# Patient Record
(demographics unavailable — no encounter records)

---

## 2024-12-09 NOTE — DISCUSSION/SUMMARY
[Anticipatory Guidance Given] : Anticipatory guidance addressed as per the history of present illness section [ Transition] :  transition [ Care] :  care [Nutritional Adequacy] : nutritional adequacy [Parental Well-Being] : parental well-being [Safety] : safety [Hepatitis B In Hospital] : Hepatitis B administered while in the hospital [FreeTextEntry1] : 10 day old female born FT via repeat C/S presenting for HCM.  Maternal prenatal labs negative.  Growth and development normal. Has regained birthweight.  PE unremarkable. Maternal depression screen passed.  Mom of AMA and with GDMA, baby sugars wnl.  CCHD and hearing screens passed.  NBS pending. G6PD wnl.  Immunizations UTD.  Right hip click in hospital - Hip US at 4-6 weeks.  Tcb at ~238hol was 6.1, serum threshold 15, photothreshold 21.4.  - Routine  care & anticipatory guidance given - Continue ad sultana feeds at least every 3 hours - Vit D as prescribed - Follow up NBS - Hip US 4-6 weeks - RTC 7 days for weight check and prn - RTC for 1 month HCM and prn - Discussed STRICT precautions for seeking immediate medical attention including but not limited to fever of 100.4F or more, yellowing or increased yellowing of skin or eyes, redness, discharge or foul odor from umbilical stump, poor feeding, lethargy or decreased responsiveness, fast or labored breathing, less than 5 wet diapers daily, rash or any other concerning sign or symptom.  Caretaker expressed understanding of the plan and agrees. All questions were answered.

## 2024-12-09 NOTE — PHYSICAL EXAM
[Alert] : alert [Normocephalic] : normocephalic [Flat Open Anterior [place]] : flat open anterior fontanelle [PERRL] : PERRL [Red Reflex Bilateral] : red reflex bilateral [Normally Placed Ears] : normally placed ears [Auricles Well Formed] : auricles well formed [Nares Patent] : nares patent [Palate Intact] : palate intact [Uvula Midline] : uvula midline [Supple, full passive range of motion] : supple, full passive range of motion [Symmetric Chest Rise] : symmetric chest rise [Clear to Auscultation Bilaterally] : clear to auscultation bilaterally [Regular Rate and Rhythm] : regular rate and rhythm [S1, S2 present] : S1, S2 present [+2 Femoral Pulses] : +2 femoral pulses [Soft] : soft [Bowel Sounds] : bowel sounds present [Umbilical Stump Dry, Clean, Intact] : umbilical stump dry, clean, intact [Normal external genitalia] : normal external genitalia [Patent Vagina] : patent vagina [Patent] : patent [Normally Placed] : normally placed [No Abnormal Lymph Nodes Palpated] : no abnormal lymph nodes palpated [Symmetric Flexed Extremities] : symmetric flexed extremities [Startle Reflex] : startle reflex present [Suck Reflex] : suck reflex present [Rooting] : rooting reflex present [Palmar Grasp] : palmar grasp present [Plantar Grasp] : plantar reflex present [Symmetric Espinoza] : symmetric [place] [Acute Distress] : no acute distress [Icteric sclera] : nonicteric sclera [Preauricular Sinus Tract] : no preauricular sinus tract [Discharge] : no discharge [Palpable Masses] : no palpable masses [Murmurs] : no murmurs [Tender] : nontender [Distended] : not distended [Hepatomegaly] : no hepatomegaly [Splenomegaly] : no splenomegaly [Clitoromegaly] : no clitoromegaly [Clavicular Crepitus] : no clavicular crepitus [Hutchison-Ortolani] : negative Hutchison-Ortolani [Spinal Dimple] : no spinal dimple [Tuft of Hair] : no tuft of hair [Jaundice] : not jaundice

## 2024-12-09 NOTE — HISTORY OF PRESENT ILLNESS
[Breast milk] : breast milk [Formula ___ oz/feed] : [unfilled] oz of formula per feed [Normal] : Normal [___ voids per day] : [unfilled] voids per day [Frequency of stools: ___] : Frequency of stools: [unfilled]  stools [per day] : per day. [In Bassinet/Crib] : sleeps in bassinet/crib [On back] : sleeps on back [Pacifier] : Uses pacifier [No] : No cigarette smoke exposure [Rear facing car seat in back seat] : Rear facing car seat in back seat [Carbon Monoxide Detectors] : Carbon monoxide detectors at home [Smoke Detectors] : Smoke detectors at home. [Hepatitis B Vaccine Given] : Hepatitis B vaccine given [Nirsevimab Given] : Nirsevimab given  [NO] : No [Co-sleeping] : no co-sleeping [Loose bedding, pillow, toys, and/or bumpers in crib] : no loose bedding, pillow, toys, and/or bumpers in crib [Exposure to electronic nicotine delivery system] : No exposure to electronic nicotine delivery system [FreeTextEntry7] : discharged 12/1/24 [de-identified] : Was using Similac and changed [place] formula Sarah [FreeTextEntry8] : no black, blood, or pale stools [FreeTextEntry1] : Grisel Grant Date/Time of Birth 2024 12:01  BW:  3535g, 83% Length:  51 cm, 84% HC:  35cm, 84%ile DW:  3315g Infant Measurement Appropriate for gestational age (AGA)  Place of Birth: [facility] Course Term Female infant born via repeat  at 38w to a  mother, aged 51.  Maternal history of GDMA2 and very advanced maternal age with IVF pregnancy.  D-sticks 52, 47, 47, 52, 72. Apgars were 9 and 9 at 1 and 5 minutes respectively. Infant was AGA. Maternal blood type A+. Hepatitis B vaccine was given. Beyfortus was given. Passed hearing B/L. TCB at 24hrs was 4.3, PT: 12.3. Prenatal labs were as follows: HIV negative 10/18/24, HBsAg negative (24), intrapartum RPR negative, Rubella immune, GBS status unknown. Maternal UDS was negative. Congenital heart disease screening was passed. [facility]  Screening #[phone]. Right hip click on PE, outpatient hip ultrasound at 4-6 weeks of life. G6PD 15.6 wnl  Older brother is 2y/o  FHx:  mom - gdma2, dad - lymphoblastic lymphoma in remission; no asthma

## 2024-12-18 NOTE — DISCUSSION/SUMMARY
[FreeTextEntry1] : 19d/o F presenting for weight check.  Regained birth weight.  Feeding, voiding, stooling appropriately.  Parents report gas.  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  Right hip click today.  - Right hip click - Hip US at 4-6 wks - Lacrimal duct stenosis - massages recommended, return if worsens or persists or redness occurs - Gassy - bicycle legs, abdominal massage, simethicone - Routine  care & anticipatory guidance given - Continue ad sultana feeds at least every 3 hours - Polyvisol/Vit D as directed - Follow up NBS & G6PD - RTC for 1 month HCM and prn - Discussed STRICT precautions for seeking immediate medical attention including but not limited to fever of 100.4F or more, yellowing or increased yellowing of skin or eyes, redness, discharge or foul odor from umbilical stump, poor feeding, lethargy or decreased responsiveness, fast or labored breathing, less than 5 wet diapers daily, rash or any other concerning sign or symptom.  Caretaker expressed understanding of the plan and agrees. All questions were answered.

## 2024-12-18 NOTE — HISTORY OF PRESENT ILLNESS
[de-identified] : weight check [FreeTextEntry6] : 19d/o presenting for weight check.  Breast milk and Holle formula fed q2hr.  Voiding and stooling well.  Has a lot of gas.  They also report eye discharge without eye redness or fever.

## 2024-12-18 NOTE — PHYSICAL EXAM
[NL] : warm, clear [Discharge] : discharge [Bilateral] : (bilateral) [Normal External Genitalia] : normal external genitalia [Patent] : patent [Conjuctival Injection] : no conjunctival injection [de-identified] : right hip click

## 2024-12-24 NOTE — PHYSICAL EXAM
[NL] : warm, clear [FreeTextEntry2] : AFOF [de-identified] : moist mucus membranes [de-identified] : no diaper rash, well perfused, well hydrated; baby acne

## 2024-12-24 NOTE — DISCUSSION/SUMMARY
[FreeTextEntry1] : 25d/o p/w viral illness in the setting of sick contacts.  Still with diarrhea but improving in PO intake and energy level.  Wel hydrated.  No respiratory distress.  Also with rash to cheek c/w  acne. - Vaseline to cheeks, return if worsens or persists. - Continue to focus on feeds and monitor diapers.  Return if any signs of worsening or persists. - Return precautions reviewed. Patient to seek medical attention in ED if has decreased oral intake, decrease in wet diapers/voids, fever >100.4F, difficulty breathing, becomes lethargic, or has a change in mental status or alertness. To note if fever > 5 days must be seen immediately either in clinic or in ED. - Return/ED precautions given.  RTC routine and prn.  Caretaker expressed understanding and all questions answered.

## 2024-12-24 NOTE — HISTORY OF PRESENT ILLNESS
[de-identified] : sick [FreeTextEntry6] : 25d/o F p/w  diarrhea since Friday, with approximately six watery, yellow stools per day. No blood has been observed in the stool. The patient's feeding was affected and baby was sleep - the infant falling asleep at the breast and the mother pumping. Total daily intake is reported as 21-22 ounces usually, was 12-14 oz for 2 day but is now returned to normal.  Activity level returned to baseline.  WD are unchanged from baseline.  A skin rash has been noted on the face, does not bother patient.  No fever. The patient's older brother recently had a viral illness (RSV and entervirus on Thursday), including a double ear infection, which may have been transmitted to the patient.

## 2025-01-10 NOTE — HISTORY OF PRESENT ILLNESS
[Parents] : parents [Normal] : Normal [In Bassinet/Crib] : sleeps in bassinet/crib [On back] : sleeps on back [Pacifier use] : Pacifier use [No] : No cigarette smoke exposure [Rear facing car seat in back seat] : Rear facing car seat in back seat [Carbon Monoxide Detectors] : Carbon monoxide detectors at home [Smoke Detectors] : Smoke detectors at home. [NO] : No [Breast milk] : breast milk [Co-sleeping] : no co-sleeping [Loose bedding, pillow, toys, and/or bumpers in crib] : no loose bedding, pillow, toys, and/or bumpers in crib [Exposure to electronic nicotine delivery system] : No exposure to electronic nicotine delivery system [FreeTextEntry7] : using eczema cream for baby acne [de-identified] : + EBM & Sarah; total 21-22oz per day [FreeTextEntry8] : using simethicone but still gassy; no blood [de-identified] : no tummy time

## 2025-01-10 NOTE — DISCUSSION/SUMMARY
[Anticipatory Guidance Given] : Anticipatory guidance addressed as per the history of present illness section [Parental Well-Being] : parental well-being [Family Adjustment] : family adjustment [Feeding Routines] : feeding routines [Infant Adjustment] : infant adjustment [Safety] : safety [FreeTextEntry1] : 1 month old F presenting for HCM. Growth and development normal. Maternal depression screen w/possible depression, post partum depression resources given.  Immunizations UTD.  - Routine care & anticipatory guidance given - Continue ad sultana feeds - Hip US 4-6 weeks of life - RTC for 2 month old HCM and prn  Caretaker expressed understanding of the plan and agrees. All questions were answered.

## 2025-01-10 NOTE — HISTORY OF PRESENT ILLNESS
[Parents] : parents [Normal] : Normal [In Bassinet/Crib] : sleeps in bassinet/crib [On back] : sleeps on back [Pacifier use] : Pacifier use [No] : No cigarette smoke exposure [Rear facing car seat in back seat] : Rear facing car seat in back seat [Carbon Monoxide Detectors] : Carbon monoxide detectors at home [Smoke Detectors] : Smoke detectors at home. [NO] : No [Breast milk] : breast milk [Co-sleeping] : no co-sleeping [Loose bedding, pillow, toys, and/or bumpers in crib] : no loose bedding, pillow, toys, and/or bumpers in crib [Exposure to electronic nicotine delivery system] : No exposure to electronic nicotine delivery system [FreeTextEntry7] : using eczema cream for baby acne [de-identified] : + EBM & Sarah; total 21-22oz per day [FreeTextEntry8] : using simethicone but still gassy; no blood [de-identified] : no tummy time

## 2025-01-10 NOTE — PHYSICAL EXAM
[Alert] : alert [Normocephalic] : normocephalic [Flat Open Anterior [place]] : flat open anterior fontanelle [PERRL] : PERRL [Red Reflex Bilateral] : red reflex bilateral [Normally Placed Ears] : normally placed ears [Auricles Well Formed] : auricles well formed [Clear Tympanic membranes] : clear tympanic membranes [Light reflex present] : light reflex present [Bony landmarks visible] : bony landmarks visible [Nares Patent] : nares patent [Palate Intact] : palate intact [Uvula Midline] : uvula midline [Supple, full passive range of motion] : supple, full passive range of motion [Symmetric Chest Rise] : symmetric chest rise [Clear to Auscultation Bilaterally] : clear to auscultation bilaterally [Regular Rate and Rhythm] : regular rate and rhythm [S1, S2 present] : S1, S2 present [+2 Femoral Pulses] : +2 femoral pulses [Soft] : soft [Bowel Sounds] : bowel sounds present [Normal external genitailia] : normal external genitalia [Patent Vagina] : vagina patent [Normally Placed] : normally placed [No Abnormal Lymph Nodes Palpated] : no abnormal lymph nodes palpated [Symmetric Flexed Extremities] : symmetric flexed extremities [Startle Reflex] : startle reflex present [Suck Reflex] : suck reflex present [Rooting] : rooting reflex present [Palmar Grasp] : palmar grasp reflex present [Plantar Grasp] : plantar grasp reflex present [Symmetric Espinoza] : symmetric [place] [Acute Distress] : no acute distress [Discharge] : no discharge [Palpable Masses] : no palpable masses [Murmurs] : no murmurs [Tender] : nontender [Distended] : not distended [Hepatomegaly] : no hepatomegaly [Splenomegaly] : no splenomegaly [Clitoromegaly] : no clitoromegaly [Hutchison-Ortolani] : negative Hutchison-Ortolani [Spinal Dimple] : no spinal dimple [Tuft of Hair] : no tuft of hair [Jaundice] : no jaundice [Rash and/or lesion present] : no rash/lesion [de-identified] : baby acne

## 2025-01-10 NOTE — PHYSICAL EXAM
[Alert] : alert [Normocephalic] : normocephalic [Flat Open Anterior [place]] : flat open anterior fontanelle [PERRL] : PERRL [Red Reflex Bilateral] : red reflex bilateral [Normally Placed Ears] : normally placed ears [Auricles Well Formed] : auricles well formed [Clear Tympanic membranes] : clear tympanic membranes [Light reflex present] : light reflex present [Bony landmarks visible] : bony landmarks visible [Nares Patent] : nares patent [Palate Intact] : palate intact [Uvula Midline] : uvula midline [Supple, full passive range of motion] : supple, full passive range of motion [Symmetric Chest Rise] : symmetric chest rise [Clear to Auscultation Bilaterally] : clear to auscultation bilaterally [Regular Rate and Rhythm] : regular rate and rhythm [S1, S2 present] : S1, S2 present [+2 Femoral Pulses] : +2 femoral pulses [Soft] : soft [Bowel Sounds] : bowel sounds present [Normal external genitailia] : normal external genitalia [Patent Vagina] : vagina patent [Normally Placed] : normally placed [No Abnormal Lymph Nodes Palpated] : no abnormal lymph nodes palpated [Symmetric Flexed Extremities] : symmetric flexed extremities [Startle Reflex] : startle reflex present [Suck Reflex] : suck reflex present [Rooting] : rooting reflex present [Palmar Grasp] : palmar grasp reflex present [Plantar Grasp] : plantar grasp reflex present [Symmetric Espinoza] : symmetric [place] [Acute Distress] : no acute distress [Discharge] : no discharge [Palpable Masses] : no palpable masses [Murmurs] : no murmurs [Tender] : nontender [Distended] : not distended [Hepatomegaly] : no hepatomegaly [Splenomegaly] : no splenomegaly [Clitoromegaly] : no clitoromegaly [Hutchison-Ortolani] : negative Hutchison-Ortolani [Spinal Dimple] : no spinal dimple [Tuft of Hair] : no tuft of hair [Jaundice] : no jaundice [Rash and/or lesion present] : no rash/lesion [de-identified] : baby acne

## 2025-01-10 NOTE — DEVELOPMENTAL MILESTONES
[Not Passed] : not passed [Normal Development] : Normal Development [None] : none [FreeTextEntry1] : possible depression, post partum depression resources given [FreeTextEntry2] : 10

## 2025-02-07 NOTE — HISTORY OF PRESENT ILLNESS
[Normal] : Normal [In Bassinet/Crib] : sleeps in bassinet/crib [On back] : sleeps on back [Pacifier use] : Pacifier use [No] : No cigarette smoke exposure [Rear facing car seat in back seat] : Rear facing car seat in back seat [Carbon Monoxide Detectors] : Carbon monoxide detectors at home [Smoke Detectors] : Smoke detectors at home. [NO] : No [Parents] : parents [Vitamins ___] : Patient takes [unfilled] vitamins daily [Co-sleeping] : no co-sleeping [Loose bedding, pillow, toys, and/or bumpers in crib] : no loose bedding, pillow, toys, and/or bumpers in crib [Exposure to electronic nicotine delivery system] : No exposure to electronic nicotine delivery system [de-identified] : EBM and Sarah [de-identified] : doing tummy time

## 2025-02-07 NOTE — HISTORY OF PRESENT ILLNESS
[Normal] : Normal [In Bassinet/Crib] : sleeps in bassinet/crib [On back] : sleeps on back [Pacifier use] : Pacifier use [No] : No cigarette smoke exposure [Rear facing car seat in back seat] : Rear facing car seat in back seat [Carbon Monoxide Detectors] : Carbon monoxide detectors at home [Smoke Detectors] : Smoke detectors at home. [NO] : No [Parents] : parents [Vitamins ___] : Patient takes [unfilled] vitamins daily [Co-sleeping] : no co-sleeping [Loose bedding, pillow, toys, and/or bumpers in crib] : no loose bedding, pillow, toys, and/or bumpers in crib [Exposure to electronic nicotine delivery system] : No exposure to electronic nicotine delivery system [de-identified] : EBM and Sarah [de-identified] : doing tummy time

## 2025-04-18 NOTE — DEVELOPMENTAL MILESTONES
[Laughs aloud] : laughs aloud [Turns to voice] : turns to voice [Vocalizes with extending cooing] : vocalizes with extending cooing [Supports on elbows & wrists in prone] : supports on elbows and wrists in prone [Keeps hands unfisted] : keeps hands unfisted [Plays with fingers in midline] : plays with fingers in midline [Grasps objects] : grasps objects [Passed] : passed [Rolls over prone to supine] : does not roll over prone to supine [FreeTextEntry1] : has rolled once, is trying [FreeTextEntry2] : 1

## 2025-04-18 NOTE — DISCUSSION/SUMMARY
[Anticipatory Guidance Given] : Anticipatory guidance addressed as per the history of present illness section [Family Functioning] : family functioning [Nutritional Adequacy and Growth] : nutritional adequacy and growth [Infant Development] : infant development [Oral Health] : oral health [Safety] : safety [] : The components of the vaccine(s) to be administered today are listed in the plan of care. The disease(s) for which the vaccine(s) are intended to prevent and the risks have been discussed with the caretaker.  The risks are also included in the appropriate vaccination information statements which have been provided to the patient's caregiver.  The caregiver has given consent to vaccinate. [FreeTextEntry1] : 4 month old F presenting for HCM. Growth and development normal. Maternal depression screen passed. Immunizations due.  Reminded to go to [facility] - has not yet done, may need xray.   - Routine care & anticipatory guidance given - Vaccines given: Pentacel, Prevnar & Rota - Post vaccine care discussed & potential side effects reviewed - Tylenol every 4 hours prn for fever or pain - Continue ad sultana feeds - No head lag: counseled on intro to solids starting with infant cereal, may slowly introduce stage 1 baby foods - Choking hazards reviewed, no cows milk or honey until after age 1 year old - RTC for 6 month old HCM and prn   Recommend breastfeeding, 8-12 feedings per day. Mother should continue prenatal vitamins and avoid alcohol. If formula is needed, recommend iron-fortified formulations, 2-4 oz every 3-4 hrs. Cereal may be introduced using a spoon and bowl. When in car, patient should be in rear-facing car seat in back seat. Put baby to sleep on back, in own crib with no loose or soft bedding. Lower crib mattress. Help baby to maintain sleep and feeding routines. May offer pacifier if needed. Continue tummy time when awake.   Caretaker expressed understanding of the plan and agrees. All questions were answered.

## 2025-04-18 NOTE — HISTORY OF PRESENT ILLNESS
[Mother] : mother [Father] : father [Breast milk] : breast milk [Formula ___ oz/feed] : [unfilled] oz of formula per feed [Hours between feeds ___] : Child is fed every [unfilled] hours [Vitamins ___] : Patient takes [unfilled] vitamins daily [Normal] : Normal [___ voids per day] : [unfilled] voids per day [Frequency of stools: ___] : Frequency of stools: [unfilled]  stools [Yellow] : yellow [In Bassinet/Crib] : sleeps in bassinet/crib [On back] : sleeps on back [Sleeps 12-16 hours per 24 hours (including naps)] : sleeps 12-16 hours per 24 hours (including naps) [Pacifier use] : Pacifier use [Tummy time] : tummy time [No] : No cigarette smoke exposure [Water heater temperature set at <120 degrees F] : Water heater temperature set at <120 degrees F [Rear facing car seat in back seat] : Rear facing car seat in back seat [Carbon Monoxide Detectors] : Carbon monoxide detectors at home [Smoke Detectors] : Smoke detectors at home. [NO] : No [Fruits] : no fruits [Vegetables] : no vegetables [Cereal] : no cereal [Co-sleeping] : no co-sleeping [Loose bedding, pillow, toys, and/or bumpers in crib] : no loose bedding, pillow, toys, and/or bumpers in crib [Exposure to electronic nicotine delivery system] : No exposure to electronic nicotine delivery system

## 2025-04-18 NOTE — PHYSICAL EXAM
[Alert] : alert [Normocephalic] : normocephalic [Flat Open Anterior [place]] : flat open anterior fontanelle [Red Reflex] : red reflex bilateral [PERRL] : PERRL [Normally Placed Ears] : normally placed ears [Auricles Well Formed] : auricles well formed [Clear Tympanic membranes] : clear tympanic membranes [Light reflex present] : light reflex present [Bony landmarks visible] : bony landmarks visible [Nares Patent] : nares patent [Palate Intact] : palate intact [Uvula Midline] : uvula midline [Symmetric Chest Rise] : symmetric chest rise [Clear to Auscultation Bilaterally] : clear to auscultation bilaterally [Regular Rate and Rhythm] : regular rate and rhythm [S1, S2 present] : S1, S2 present [Soft] : soft [Bowel Sounds] : bowel sounds present [Normal External Genitalia] : normal external genitalia [Normal Vaginal Introitus] : normal vaginal introitus [Patent] : patent [Normally Placed] : normally placed [No Abnormal Lymph Nodes Palpated] : no abnormal lymph nodes palpated [Startle Reflex] : startle reflex present [Plantar Grasp] : plantar grasp reflex present [Symmetric Espinoza] : symmetric [place] [Acute Distress] : no acute distress [Discharge] : no discharge [Palpable Masses] : no palpable masses [Murmurs] : no murmurs [Tender] : nontender [Distended] : nondistended [Hepatomegaly] : no hepatomegaly [Splenomegaly] : no splenomegaly [Clitoromegaly] : no clitoromegaly [Hutchison-Ortolani] : negative Hutchison-Ortolani [Allis Sign] : negative Allis sign [Spinal Dimple] : no spinal dimple [Tuft of Hair] : no tuft of hair [Rash or Lesions] : no rash/lesions

## 2025-06-13 NOTE — DISCUSSION/SUMMARY
[Family Functioning] : family functioning [Nutrition and Feeding] : nutrition and feeding [Infant Development] : infant development [Oral Health] : oral health [Safety] : safety [] : The components of the vaccine(s) to be administered today are listed in the plan of care. The disease(s) for which the vaccine(s) are intended to prevent and the risks have been discussed with the caretaker.  The risks are also included in the appropriate vaccination information statements which have been provided to the patient's caregiver.  The caregiver has given consent to vaccinate. [FreeTextEntry1] : 6 month old F presenting for HCM. Growth and development normal. Maternal depression screen passed. Immunizations due today.   - Routine care & anticipatory guidance given - Vaccines given:  Vaxelis, PCV, Rota - Post vaccine care discussed & potential side effects reviewed - Tylenol every 4 hours prn for fever or pain - Continue ad sultana feeds and intro to solids, may advance to stage 2 baby foods - Choking hazards reviewed, no cows milk or honey until after age 1 year old - PT referral for cranial eval (slightest plagiocephaly) - Hip US pending - advised that may need xray - RTC for 9 month old HCM and prn   Caretaker expressed understanding of the plan and agrees. All questions were answered.

## 2025-06-13 NOTE — DEVELOPMENTAL MILESTONES
[Normal Development] : Normal Development [Pats or smiles at reflection] : pats or smiles at reflection [Begins to turn when name called] : begins to turn when name called [Babbles] : babbles [Rolls over prone to supine] : rolls over prone to supine [Sits briefly without support] : sits briefly without support [Reaches for object and transfers] : reaches for object and transfers [Rakes small object with 4 fingers] : rakes small object with 4 fingers [[place] small object on surface] : bangs small object on surface [Passed] : passed [FreeTextEntry2] : 6

## 2025-06-13 NOTE — PHYSICAL EXAM
[Alert] : alert [Normocephalic] : normocephalic [Flat Open Anterior [place]] : flat open anterior fontanelle [Red Reflex] : red reflex bilateral [PERRL] : PERRL [Normally Placed Ears] : normally placed ears [Auricles Well Formed] : auricles well formed [Clear Tympanic membranes] : clear tympanic membranes [Light reflex present] : light reflex present [Bony landmarks visible] : bony landmarks visible [Nares Patent] : nares patent [Palate Intact] : palate intact [Uvula Midline] : uvula midline [Supple, full passive range of motion] : supple, full passive range of motion [Symmetric Chest Rise] : symmetric chest rise [Clear to Auscultation Bilaterally] : clear to auscultation bilaterally [Regular Rate and Rhythm] : regular rate and rhythm [S1, S2 present] : S1, S2 present [+2 Femoral Pulses] : (+) 2 femoral pulses [Soft] : soft [Bowel Sounds] : bowel sounds present [Normal External Genitalia] : normal external genitalia [Normal Vaginal Introitus] : normal vaginal introitus [Patent] : patent [Normally Placed] : normally placed [No Abnormal Lymph Nodes Palpated] : no abnormal lymph nodes palpated [Symmetric Buttocks Creases] : symmetric buttocks creases [Plantar Grasp] : plantar grasp reflex present [Cranial Nerves Grossly Intact] : cranial nerves grossly intact [Acute Distress] : no acute distress [Discharge] : no discharge [Tooth Eruption] : no tooth eruption [Palpable Masses] : no palpable masses [Murmurs] : no murmurs [Tender] : nontender [Distended] : nondistended [Hepatomegaly] : no hepatomegaly [Splenomegaly] : no splenomegaly [Clitoromegaly] : no clitoromegaly [Hutchison-Ortolani] : negative Hutchison-Ortolani [Allis Sign] : negative Allis sign [Spinal Dimple] : no spinal dimple [Tuft of Hair] : no tuft of hair [Rash or Lesions] : no rash/lesions [de-identified] : very very slight positional plagiocephaly

## 2025-06-13 NOTE — HISTORY OF PRESENT ILLNESS
[Parents] : parents [Breast milk] : breast milk [Hours between feeds ___] : Child is fed every [unfilled] hours [Fruits] : fruits [Vegetables] : vegetables [Cereal] : cereal [Normal] : Normal [Frequency of stools: ___] : Frequency of stools: [unfilled]  stools [Yellow] : yellow [Firm] : firm consistency [In Bassinet/Crib] : sleeps in bassinet/crib [On back] : sleeps on back [Sleeps 12-16 hours per 24 hours (including naps)] : sleeps 12-16 hours per 24 hours (including naps) [Tummy time] : tummy time [No] : No cigarette smoke exposure [Water heater temperature set at <120 degrees F] : Water heater temperature set at <120 degrees F [Rear facing car seat in back seat] : Rear facing car seat in back seat [Carbon Monoxide Detectors] : Carbon monoxide detectors at home [Smoke Detectors] : Smoke detectors at home. [NO] : No [Vitamins ___] : Patient takes [unfilled] vitamins daily [Egg] : no egg [Meat] : no meat [Fish] : no fish [Peanut] : no peanut [Dairy] : no dairy [Co-sleeping] : no co-sleeping [Loose bedding, pillow, toys, and/or bumpers in crib] : no loose bedding, pillow, toys, and/or bumpers in crib [Pacifier use] : not using pacifier [Exposure to electronic nicotine delivery system] : No exposure to electronic nicotine delivery system [de-identified] : they report she turn her head more to one side so has a slight flat head [de-identified] : & Formula [FreeTextEntry1] : Hip US not able to do as mom had insurance gap